# Patient Record
(demographics unavailable — no encounter records)

---

## 2025-07-29 NOTE — PHYSICAL EXAM
[General Appearance - Alert] : alert [2+] : left foot dorsalis pedis 2+ [Normal Foot/Ankle] : Both lower extremities were exposed and visualized. Standing exam demonstrates normal foot posture and alignment. Hindfoot exam shows no hindfoot valgus or varus [Sensation] : the sensory exam was normal to light touch and pinprick [Delayed in the Right Toes] : capillary refills normal in right toes [Delayed in the Left Toes] : capillary refills normal in the left toes [de-identified] : Normal ROM, no lmiitation in DF of 1st ray b/l, 5/5 MSK strength  HAV b/l  [FreeTextEntry1] : Skin is supple and well-hydrated, No signs of an open wound, no clinical signs of an infection  Fluctuant lesion felt proximal to left first Metatarsal head

## 2025-07-29 NOTE — END OF VISIT
[] : Resident [FreeTextEntry3] : Patient presents for evaluation of bilateral bunion deformity Physical exam there is noted mild bunion deformity on the left there is noted palpable soft tissue mass overlying the medial aspect of the first metatarsal head.  Possible ganglion cyst Discussed surgical management of bunion correction along with management of ganglion Patient referred for x-rays and MRI Follow-up after MRI

## 2025-07-29 NOTE — ASSESSMENT
[FreeTextEntry1] : Halluxabductovalgus, bilateral feet  Questions and concerns of patient were addressed.  Patient was informed about treatment options to correct bunion deformities, which included conservative treatment, such as bunion shields, or surgical intervention.  Xrays of bilateral feet were ordered.  MR with contrast of left foot was ordered in order to rule out ganglion cyst of left first metatarsal.  Patient will RTC in 2 weeks.

## 2025-07-29 NOTE — PHYSICAL EXAM
[General Appearance - Alert] : alert [2+] : left foot dorsalis pedis 2+ [Normal Foot/Ankle] : Both lower extremities were exposed and visualized. Standing exam demonstrates normal foot posture and alignment. Hindfoot exam shows no hindfoot valgus or varus [Sensation] : the sensory exam was normal to light touch and pinprick [Delayed in the Right Toes] : capillary refills normal in right toes [Delayed in the Left Toes] : capillary refills normal in the left toes [de-identified] : Normal ROM, no lmiitation in DF of 1st ray b/l, 5/5 MSK strength  HAV b/l  [FreeTextEntry1] : Skin is supple and well-hydrated, No signs of an open wound, no clinical signs of an infection  Fluctuant lesion felt proximal to left first Metatarsal head

## 2025-07-29 NOTE — HISTORY OF PRESENT ILLNESS
[FreeTextEntry1] : Patient presents to clinic for chief complaint of painful bunions of bilateral feet. Patient stated that she has pain while walking but not when she is resting.  Patient ambulated to clinic with slippers.